# Patient Record
(demographics unavailable — no encounter records)

---

## 2024-11-02 NOTE — DISCUSSION/SUMMARY
[FreeTextEntry1] : RUSLAN is a 3 year M with acute viral syndrome.   Recommend supportive care including antipyretics, fluids, OTC cough/cold medications if age-appropriate, and nasal saline followed by nasal suction. Patient to be brought to the ED if has persistent decreased oral intake, decrease in wet diapers, fever >100.4F or becomes patient becomes lethargic or changed in mental status and alertness. To note if fever > 5 days must be seen immediately either in clinic or in ED.  In order to maintain hydration consume "oral rehydration solution," such as Pedialyte or low calorie sports drinks. If vomiting, try to give child a few teaspoons of fluid every few minutes. Avoid drinking juice or soda. These can make diarrhea worse. If tolerating solids, its best to consume lean meats, fruits, vegetables, and whole-grain breads and cereals. Avoid eating foods with a lot of fat or sugar, which can make symptoms worse.  Caretaker expressed understanding of the plan and agrees. No other concerns or questions today.

## 2024-11-02 NOTE — HISTORY OF PRESENT ILLNESS
[FreeTextEntry6] : cough sneezing runny nose since yesterday went outside for recess, no jacket  No fever above 100.4F, vomiting, diarrhea, sob or difficulty breathing, joint pain or swelling, rash, urinary symptoms, headaches, ear pain or tugging No meds or treatments tried.  Good activity, hydration and po intake. Acting at baseline otherwise.

## 2025-04-19 NOTE — HISTORY OF PRESENT ILLNESS
[Parents] : parents [Normal] : Normal [Brushing teeth] : Brushing teeth [Yes] : Patient goes to dentist yearly [Toothpaste] : Primary Fluoride Source: Toothpaste [Appropiate parent-child communication] : Appropriate parent-child communication [No] : Not at  exposure [Carbon Monoxide Detectors] : Carbon monoxide detectors [Smoke Detectors] : Smoke detectors [Up to date] : Up to date [FreeTextEntry7] : Doing well - no major hospitalizations or ED visits  [de-identified] : well balanced, no food allergies [FreeTextEntry9] : going into [employer] services - speech 3x, ot 1x [NO] : No

## 2025-04-19 NOTE — DEVELOPMENTAL MILESTONES
[Normal Development] : Normal Development [None] : none [FreeTextEntry1] : improved speech, working on making more clear words

## 2025-04-19 NOTE — PHYSICAL EXAM

## 2025-04-19 NOTE — DISCUSSION/SUMMARY
[Normal Growth] : growth [Normal Development] : development  [No Elimination Concerns] : elimination [Continue Regimen] : feeding [No Skin Concerns] : skin [Normal Sleep Pattern] : sleep [None] : no medical problems [School Readiness] : school readiness [Healthy Personal Habits] : healthy personal habits [TV/Media] : tv/media [Child and Family Involvement] : child and family involvement [Safety] : safety [Anticipatory Guidance Given] : Anticipatory guidance addressed as per the history of present illness section [DTaP] : diptheria, tetanus and pertussis [IPV] : inactivated poliovirus [MMR] : measles, mumps and rubella [Varicella] : varicella [No Medications] : ~He/She~ is not on any medications [Parent/Guardian] : Parent/Guardian [FreeTextEntry3] : Labs drawn in office by MD Zoe Marrero [] : The components of the vaccine(s) to be administered today are listed in the plan of care. The disease(s) for which the vaccine(s) are intended to prevent and the risks have been discussed with the caretaker.  The risks are also included in the appropriate vaccination information statements which have been provided to the patient's caregiver.  The caregiver has given consent to vaccinate.